# Patient Record
Sex: MALE | Race: WHITE | Employment: OTHER | URBAN - METROPOLITAN AREA
[De-identification: names, ages, dates, MRNs, and addresses within clinical notes are randomized per-mention and may not be internally consistent; named-entity substitution may affect disease eponyms.]

---

## 2017-12-22 ENCOUNTER — TELEPHONE (OUTPATIENT)
Dept: CARDIOLOGY CLINIC | Age: 82
End: 2017-12-22

## 2017-12-22 NOTE — TELEPHONE ENCOUNTER
Verified patient with two types of identifiers. Spoke to daughter and he is in a lock marrufo called [facility] and [address] [place] [address]. They contacted our office to see if he needed to follow up and were told that he has no showed several appointments. The daughter needs a note stating that he does not need a follow up appointment with Dr Syd Dean to send to his current MD at the [facility] and [address] [place] [address]. They are trying to get him in to see Dr Syd Dean and the daughter does not want him to have to leave as he is confused and she feels that this will be too much for him. Will check with MD/NP to see if we can do a letter stating that no more follow up with us is needed.

## 2017-12-22 NOTE — TELEPHONE ENCOUNTER
Verified patient with two types of identifiers. Notified daughter and letter ready for . She will  some time next week.

## 2017-12-22 NOTE — LETTER
12/22/2017 2:52 PM 
 
Mr. Samra Perez 
[facility] [id number] To Whom it may concern, No further follow up is needed for the above patient. If you have any questions please contact our office at [phone].   
 
 
 
 
 
 
 
Sincerely, 
 
 
Raquel Alvarez MD

## 2017-12-22 NOTE — TELEPHONE ENCOUNTER
Patients daughter would like to speak with someone to just clarify some things from the last hospital visit a while ago.   Phone: [phone]

## 2020-01-01 ENCOUNTER — HOSPICE ADMISSION (OUTPATIENT)
Dept: HOSPICE | Facility: HOSPICE | Age: 85
End: 2020-01-01
Payer: MEDICARE

## 2020-01-01 ENCOUNTER — APPOINTMENT (OUTPATIENT)
Dept: GENERAL RADIOLOGY | Age: 85
DRG: 951 | End: 2020-01-01
Attending: EMERGENCY MEDICINE
Payer: OTHER MISCELLANEOUS

## 2020-01-01 ENCOUNTER — HOSPITAL ENCOUNTER (EMERGENCY)
Age: 85
Discharge: HOME | End: 2020-10-04
Attending: EMERGENCY MEDICINE
Payer: MEDICARE

## 2020-01-01 ENCOUNTER — APPOINTMENT (OUTPATIENT)
Dept: GENERAL RADIOLOGY | Age: 85
End: 2020-01-01
Attending: EMERGENCY MEDICINE
Payer: MEDICARE

## 2020-01-01 ENCOUNTER — TELEPHONE (OUTPATIENT)
Dept: CARDIOLOGY CLINIC | Age: 85
End: 2020-01-01

## 2020-01-01 ENCOUNTER — HOSPITAL ENCOUNTER (INPATIENT)
Age: 85
LOS: 1 days | DRG: 951 | End: 2020-10-10
Attending: EMERGENCY MEDICINE | Admitting: FAMILY MEDICINE
Payer: OTHER MISCELLANEOUS

## 2020-01-01 VITALS
RESPIRATION RATE: 24 BRPM | WEIGHT: 176 LBS | SYSTOLIC BLOOD PRESSURE: 90 MMHG | OXYGEN SATURATION: 92 % | HEIGHT: 73 IN | TEMPERATURE: 100.7 F | HEART RATE: 131 BPM | BODY MASS INDEX: 23.33 KG/M2 | DIASTOLIC BLOOD PRESSURE: 62 MMHG

## 2020-01-01 VITALS
DIASTOLIC BLOOD PRESSURE: 45 MMHG | OXYGEN SATURATION: 98 % | HEART RATE: 62 BPM | SYSTOLIC BLOOD PRESSURE: 150 MMHG | TEMPERATURE: 98 F | RESPIRATION RATE: 16 BRPM

## 2020-01-01 DIAGNOSIS — R06.4 LABORED BREATHING: ICD-10-CM

## 2020-01-01 DIAGNOSIS — R06.82 TACHYPNEA: ICD-10-CM

## 2020-01-01 DIAGNOSIS — U07.1 COVID-19: Primary | ICD-10-CM

## 2020-01-01 DIAGNOSIS — U07.1 COVID-19: ICD-10-CM

## 2020-01-01 DIAGNOSIS — R45.1 RESTLESSNESS AND AGITATION: ICD-10-CM

## 2020-01-01 DIAGNOSIS — R41.89 DECREASED RESPONSIVENESS: ICD-10-CM

## 2020-01-01 DIAGNOSIS — J96.01 ACUTE RESPIRATORY FAILURE WITH HYPOXIA (HCC): Primary | ICD-10-CM

## 2020-01-01 DIAGNOSIS — Z51.5 HOSPICE CARE: ICD-10-CM

## 2020-01-01 DIAGNOSIS — R52 GENERALIZED PAIN: ICD-10-CM

## 2020-01-01 LAB
ALBUMIN SERPL-MCNC: 2.9 G/DL (ref 3.5–5)
ALBUMIN/GLOB SERPL: 0.6 {RATIO} (ref 1.1–2.2)
ALP SERPL-CCNC: 68 U/L (ref 45–117)
ALT SERPL-CCNC: 70 U/L (ref 12–78)
ANION GAP SERPL CALC-SCNC: 10 MMOL/L (ref 5–15)
ANION GAP SERPL CALC-SCNC: 6 MMOL/L (ref 5–15)
APPEARANCE UR: ABNORMAL
AST SERPL-CCNC: 143 U/L (ref 15–37)
ATRIAL RATE: 140 BPM
BACTERIA URNS QL MICRO: NEGATIVE /HPF
BASOPHILS # BLD: 0 K/UL (ref 0–0.1)
BASOPHILS # BLD: 0 K/UL (ref 0–0.1)
BASOPHILS NFR BLD: 0 % (ref 0–1)
BASOPHILS NFR BLD: 1 % (ref 0–1)
BILIRUB SERPL-MCNC: 0.7 MG/DL (ref 0.2–1)
BILIRUB UR QL CFM: NEGATIVE
BUN SERPL-MCNC: 23 MG/DL (ref 6–20)
BUN SERPL-MCNC: 51 MG/DL (ref 6–20)
BUN/CREAT SERPL: 17 (ref 12–20)
BUN/CREAT SERPL: 29 (ref 12–20)
CALCIUM SERPL-MCNC: 8.9 MG/DL (ref 8.5–10.1)
CALCIUM SERPL-MCNC: 9.6 MG/DL (ref 8.5–10.1)
CALCULATED P AXIS, ECG09: 85 DEGREES
CALCULATED R AXIS, ECG10: -20 DEGREES
CALCULATED T AXIS, ECG11: 44 DEGREES
CHLORIDE SERPL-SCNC: 105 MMOL/L (ref 97–108)
CHLORIDE SERPL-SCNC: 109 MMOL/L (ref 97–108)
CO2 SERPL-SCNC: 23 MMOL/L (ref 21–32)
CO2 SERPL-SCNC: 26 MMOL/L (ref 21–32)
COLOR UR: ABNORMAL
COMMENT, HOLDF: NORMAL
COMMENT, HOLDF: NORMAL
CREAT SERPL-MCNC: 1.38 MG/DL (ref 0.7–1.3)
CREAT SERPL-MCNC: 1.76 MG/DL (ref 0.7–1.3)
DIAGNOSIS, 93000: NORMAL
DIFFERENTIAL METHOD BLD: ABNORMAL
DIFFERENTIAL METHOD BLD: ABNORMAL
EOSINOPHIL # BLD: 0 K/UL (ref 0–0.4)
EOSINOPHIL # BLD: 0 K/UL (ref 0–0.4)
EOSINOPHIL NFR BLD: 0 % (ref 0–7)
EOSINOPHIL NFR BLD: 0 % (ref 0–7)
EPITH CASTS URNS QL MICRO: ABNORMAL /LPF
ERYTHROCYTE [DISTWIDTH] IN BLOOD BY AUTOMATED COUNT: 14.3 % (ref 11.5–14.5)
ERYTHROCYTE [DISTWIDTH] IN BLOOD BY AUTOMATED COUNT: 15.8 % (ref 11.5–14.5)
GLOBULIN SER CALC-MCNC: 4.6 G/DL (ref 2–4)
GLUCOSE SERPL-MCNC: 106 MG/DL (ref 65–100)
GLUCOSE SERPL-MCNC: 97 MG/DL (ref 65–100)
GLUCOSE UR STRIP.AUTO-MCNC: NEGATIVE MG/DL
GRAN CASTS URNS QL MICRO: ABNORMAL /LPF
HCT VFR BLD AUTO: 41 % (ref 36.6–50.3)
HCT VFR BLD AUTO: 51.7 % (ref 36.6–50.3)
HGB BLD-MCNC: 13.2 G/DL (ref 12.1–17)
HGB BLD-MCNC: 16.6 G/DL (ref 12.1–17)
HGB UR QL STRIP: ABNORMAL
IMM GRANULOCYTES # BLD AUTO: 0.1 K/UL (ref 0–0.04)
IMM GRANULOCYTES # BLD AUTO: 0.1 K/UL (ref 0–0.04)
IMM GRANULOCYTES NFR BLD AUTO: 1 % (ref 0–0.5)
IMM GRANULOCYTES NFR BLD AUTO: 1 % (ref 0–0.5)
KETONES UR QL STRIP.AUTO: NEGATIVE MG/DL
LACTATE SERPL-SCNC: 1.8 MMOL/L (ref 0.4–2)
LEUKOCYTE ESTERASE UR QL STRIP.AUTO: NEGATIVE
LYMPHOCYTES # BLD: 0.6 K/UL (ref 0.8–3.5)
LYMPHOCYTES # BLD: 0.8 K/UL (ref 0.8–3.5)
LYMPHOCYTES NFR BLD: 13 % (ref 12–49)
LYMPHOCYTES NFR BLD: 23 % (ref 12–49)
MCH RBC QN AUTO: 27.2 PG (ref 26–34)
MCH RBC QN AUTO: 27.6 PG (ref 26–34)
MCHC RBC AUTO-ENTMCNC: 32.1 G/DL (ref 30–36.5)
MCHC RBC AUTO-ENTMCNC: 32.2 G/DL (ref 30–36.5)
MCV RBC AUTO: 84.6 FL (ref 80–99)
MCV RBC AUTO: 85.6 FL (ref 80–99)
MONOCYTES # BLD: 0.2 K/UL (ref 0–1)
MONOCYTES # BLD: 0.7 K/UL (ref 0–1)
MONOCYTES NFR BLD: 19 % (ref 5–13)
MONOCYTES NFR BLD: 4 % (ref 5–13)
NEUTS SEG # BLD: 1.9 K/UL (ref 1.8–8)
NEUTS SEG # BLD: 4 K/UL (ref 1.8–8)
NEUTS SEG NFR BLD: 56 % (ref 32–75)
NEUTS SEG NFR BLD: 82 % (ref 32–75)
NITRITE UR QL STRIP.AUTO: NEGATIVE
NRBC # BLD: 0 K/UL (ref 0–0.01)
NRBC # BLD: 0 K/UL (ref 0–0.01)
NRBC BLD-RTO: 0 PER 100 WBC
NRBC BLD-RTO: 0 PER 100 WBC
P-R INTERVAL, ECG05: 174 MS
PH UR STRIP: 5 [PH] (ref 5–8)
PLATELET # BLD AUTO: 131 K/UL (ref 150–400)
PLATELET # BLD AUTO: 173 K/UL (ref 150–400)
PMV BLD AUTO: 10.6 FL (ref 8.9–12.9)
PMV BLD AUTO: 10.8 FL (ref 8.9–12.9)
POTASSIUM SERPL-SCNC: 3.9 MMOL/L (ref 3.5–5.1)
POTASSIUM SERPL-SCNC: 4 MMOL/L (ref 3.5–5.1)
PROT SERPL-MCNC: 7.5 G/DL (ref 6.4–8.2)
PROT UR STRIP-MCNC: 100 MG/DL
Q-T INTERVAL, ECG07: 328 MS
QRS DURATION, ECG06: 74 MS
QTC CALCULATION (BEZET), ECG08: 500 MS
RBC # BLD AUTO: 4.79 M/UL (ref 4.1–5.7)
RBC # BLD AUTO: 6.11 M/UL (ref 4.1–5.7)
RBC #/AREA URNS HPF: ABNORMAL /HPF (ref 0–5)
RBC MORPH BLD: ABNORMAL
SAMPLES BEING HELD,HOLD: NORMAL
SAMPLES BEING HELD,HOLD: NORMAL
SODIUM SERPL-SCNC: 137 MMOL/L (ref 136–145)
SODIUM SERPL-SCNC: 142 MMOL/L (ref 136–145)
SP GR UR REFRACTOMETRY: 1.02 (ref 1–1.03)
UR CULT HOLD, URHOLD: NORMAL
UROBILINOGEN UR QL STRIP.AUTO: 1 EU/DL (ref 0.2–1)
VENTRICULAR RATE, ECG03: 140 BPM
WBC # BLD AUTO: 3.5 K/UL (ref 4.1–11.1)
WBC # BLD AUTO: 4.9 K/UL (ref 4.1–11.1)
WBC URNS QL MICRO: ABNORMAL /HPF (ref 0–4)
YEAST URNS QL MICRO: PRESENT

## 2020-01-01 PROCEDURE — 93005 ELECTROCARDIOGRAM TRACING: CPT

## 2020-01-01 PROCEDURE — 36415 COLL VENOUS BLD VENIPUNCTURE: CPT

## 2020-01-01 PROCEDURE — 96375 TX/PRO/DX INJ NEW DRUG ADDON: CPT

## 2020-01-01 PROCEDURE — 80048 BASIC METABOLIC PNL TOTAL CA: CPT

## 2020-01-01 PROCEDURE — 80053 COMPREHEN METABOLIC PANEL: CPT

## 2020-01-01 PROCEDURE — 74011000258 HC RX REV CODE- 258: Performed by: EMERGENCY MEDICINE

## 2020-01-01 PROCEDURE — 74011000250 HC RX REV CODE- 250: Performed by: FAMILY MEDICINE

## 2020-01-01 PROCEDURE — 85025 COMPLETE CBC W/AUTO DIFF WBC: CPT

## 2020-01-01 PROCEDURE — 74011250636 HC RX REV CODE- 250/636: Performed by: EMERGENCY MEDICINE

## 2020-01-01 PROCEDURE — 74011250636 HC RX REV CODE- 250/636: Performed by: FAMILY MEDICINE

## 2020-01-01 PROCEDURE — 65270000029 HC RM PRIVATE

## 2020-01-01 PROCEDURE — 99291 CRITICAL CARE FIRST HOUR: CPT

## 2020-01-01 PROCEDURE — 0656 HSPC GENERAL INPATIENT

## 2020-01-01 PROCEDURE — 87040 BLOOD CULTURE FOR BACTERIA: CPT

## 2020-01-01 PROCEDURE — 83605 ASSAY OF LACTIC ACID: CPT

## 2020-01-01 PROCEDURE — 96367 TX/PROPH/DG ADDL SEQ IV INF: CPT

## 2020-01-01 PROCEDURE — 5A09357 ASSISTANCE WITH RESPIRATORY VENTILATION, LESS THAN 24 CONSECUTIVE HOURS, CONTINUOUS POSITIVE AIRWAY PRESSURE: ICD-10-PCS | Performed by: FAMILY MEDICINE

## 2020-01-01 PROCEDURE — 51701 INSERT BLADDER CATHETER: CPT

## 2020-01-01 PROCEDURE — 99223 1ST HOSP IP/OBS HIGH 75: CPT | Performed by: FAMILY MEDICINE

## 2020-01-01 PROCEDURE — 3336500001 HSPC ELECTION

## 2020-01-01 PROCEDURE — 71045 X-RAY EXAM CHEST 1 VIEW: CPT

## 2020-01-01 PROCEDURE — 74011250637 HC RX REV CODE- 250/637: Performed by: NURSE PRACTITIONER

## 2020-01-01 PROCEDURE — 81001 URINALYSIS AUTO W/SCOPE: CPT

## 2020-01-01 PROCEDURE — 94660 CPAP INITIATION&MGMT: CPT

## 2020-01-01 PROCEDURE — 96365 THER/PROPH/DIAG IV INF INIT: CPT

## 2020-01-01 PROCEDURE — 99285 EMERGENCY DEPT VISIT HI MDM: CPT

## 2020-01-01 PROCEDURE — 74011250637 HC RX REV CODE- 250/637: Performed by: EMERGENCY MEDICINE

## 2020-01-01 RX ORDER — ONDANSETRON 2 MG/ML
4 INJECTION INTRAMUSCULAR; INTRAVENOUS
Status: COMPLETED | OUTPATIENT
Start: 2020-01-01 | End: 2020-01-01

## 2020-01-01 RX ORDER — ACETAMINOPHEN 650 MG/1
650 SUPPOSITORY RECTAL
Status: DISCONTINUED | OUTPATIENT
Start: 2020-01-01 | End: 2020-10-11 | Stop reason: HOSPADM

## 2020-01-01 RX ORDER — MORPHINE SULFATE 2 MG/ML
4 INJECTION, SOLUTION INTRAMUSCULAR; INTRAVENOUS ONCE
Status: COMPLETED | OUTPATIENT
Start: 2020-01-01 | End: 2020-01-01

## 2020-01-01 RX ORDER — ACETAMINOPHEN 650 MG/1
650 SUPPOSITORY RECTAL
Status: COMPLETED | OUTPATIENT
Start: 2020-01-01 | End: 2020-01-01

## 2020-01-01 RX ORDER — HYDROMORPHONE HYDROCHLORIDE 1 MG/ML
0.5 INJECTION, SOLUTION INTRAMUSCULAR; INTRAVENOUS; SUBCUTANEOUS EVERY 4 HOURS
Status: DISCONTINUED | OUTPATIENT
Start: 2020-01-01 | End: 2020-01-01

## 2020-01-01 RX ORDER — MORPHINE SULFATE 2 MG/ML
2 INJECTION, SOLUTION INTRAMUSCULAR; INTRAVENOUS
Status: DISCONTINUED | OUTPATIENT
Start: 2020-01-01 | End: 2020-01-01

## 2020-01-01 RX ORDER — HYDROMORPHONE HYDROCHLORIDE 1 MG/ML
1 INJECTION, SOLUTION INTRAMUSCULAR; INTRAVENOUS; SUBCUTANEOUS
Status: DISCONTINUED | OUTPATIENT
Start: 2020-01-01 | End: 2020-10-11 | Stop reason: HOSPADM

## 2020-01-01 RX ORDER — GLYCOPYRROLATE 0.2 MG/ML
0.2 INJECTION INTRAMUSCULAR; INTRAVENOUS
Status: DISCONTINUED | OUTPATIENT
Start: 2020-01-01 | End: 2020-10-11 | Stop reason: HOSPADM

## 2020-01-01 RX ORDER — HYDROMORPHONE HYDROCHLORIDE 1 MG/ML
1 INJECTION, SOLUTION INTRAMUSCULAR; INTRAVENOUS; SUBCUTANEOUS EVERY 4 HOURS
Status: DISCONTINUED | OUTPATIENT
Start: 2020-01-01 | End: 2020-10-11 | Stop reason: HOSPADM

## 2020-01-01 RX ORDER — MORPHINE SULFATE 2 MG/ML
2 INJECTION, SOLUTION INTRAMUSCULAR; INTRAVENOUS EVERY 4 HOURS
Status: DISCONTINUED | OUTPATIENT
Start: 2020-01-01 | End: 2020-01-01

## 2020-01-01 RX ORDER — ONDANSETRON 2 MG/ML
4 INJECTION INTRAMUSCULAR; INTRAVENOUS
Status: DISCONTINUED | OUTPATIENT
Start: 2020-01-01 | End: 2020-10-11 | Stop reason: HOSPADM

## 2020-01-01 RX ORDER — HYDROMORPHONE HYDROCHLORIDE 1 MG/ML
0.5 INJECTION, SOLUTION INTRAMUSCULAR; INTRAVENOUS; SUBCUTANEOUS
Status: DISCONTINUED | OUTPATIENT
Start: 2020-01-01 | End: 2020-01-01

## 2020-01-01 RX ORDER — KETOROLAC TROMETHAMINE 30 MG/ML
30 INJECTION, SOLUTION INTRAMUSCULAR; INTRAVENOUS
Status: DISCONTINUED | OUTPATIENT
Start: 2020-01-01 | End: 2020-10-11 | Stop reason: HOSPADM

## 2020-01-01 RX ORDER — SODIUM CHLORIDE 0.9 % (FLUSH) 0.9 %
10 SYRINGE (ML) INJECTION AS NEEDED
Status: DISCONTINUED | OUTPATIENT
Start: 2020-01-01 | End: 2020-10-11 | Stop reason: HOSPADM

## 2020-01-01 RX ORDER — LORAZEPAM 2 MG/ML
0.5 INJECTION INTRAMUSCULAR
Status: DISCONTINUED | OUTPATIENT
Start: 2020-01-01 | End: 2020-10-11 | Stop reason: HOSPADM

## 2020-01-01 RX ORDER — FACIAL-BODY WIPES
10 EACH TOPICAL DAILY PRN
Status: DISCONTINUED | OUTPATIENT
Start: 2020-01-01 | End: 2020-10-11 | Stop reason: HOSPADM

## 2020-01-01 RX ORDER — LORAZEPAM 2 MG/ML
0.5 INJECTION INTRAMUSCULAR EVERY 4 HOURS
Status: DISCONTINUED | OUTPATIENT
Start: 2020-01-01 | End: 2020-10-11 | Stop reason: HOSPADM

## 2020-01-01 RX ADMIN — HYDROMORPHONE HYDROCHLORIDE 1 MG: 1 INJECTION, SOLUTION INTRAMUSCULAR; INTRAVENOUS; SUBCUTANEOUS at 23:20

## 2020-01-01 RX ADMIN — MORPHINE SULFATE 2 MG: 2 INJECTION, SOLUTION INTRAMUSCULAR; INTRAVENOUS at 19:33

## 2020-01-01 RX ADMIN — MORPHINE SULFATE 2 MG: 2 INJECTION, SOLUTION INTRAMUSCULAR; INTRAVENOUS at 18:48

## 2020-01-01 RX ADMIN — Medication 10 ML: at 13:03

## 2020-01-01 RX ADMIN — SODIUM CHLORIDE 500 ML: 9 INJECTION, SOLUTION INTRAVENOUS at 12:31

## 2020-01-01 RX ADMIN — ONDANSETRON 4 MG: 2 INJECTION INTRAMUSCULAR; INTRAVENOUS at 14:27

## 2020-01-01 RX ADMIN — LORAZEPAM 0.5 MG: 2 INJECTION INTRAMUSCULAR; INTRAVENOUS at 23:22

## 2020-01-01 RX ADMIN — LORAZEPAM 0.5 MG: 2 INJECTION INTRAMUSCULAR; INTRAVENOUS at 15:45

## 2020-01-01 RX ADMIN — LORAZEPAM 0.5 MG: 2 INJECTION INTRAMUSCULAR; INTRAVENOUS at 21:29

## 2020-01-01 RX ADMIN — HYDROMORPHONE HYDROCHLORIDE 1 MG: 1 INJECTION, SOLUTION INTRAMUSCULAR; INTRAVENOUS; SUBCUTANEOUS at 03:44

## 2020-01-01 RX ADMIN — MORPHINE SULFATE 2 MG: 2 INJECTION, SOLUTION INTRAMUSCULAR; INTRAVENOUS at 16:50

## 2020-01-01 RX ADMIN — AZITHROMYCIN MONOHYDRATE 500 MG: 500 INJECTION, POWDER, LYOPHILIZED, FOR SOLUTION INTRAVENOUS at 13:33

## 2020-01-01 RX ADMIN — SODIUM CHLORIDE 500 ML: 9 INJECTION, SOLUTION INTRAVENOUS at 11:34

## 2020-01-01 RX ADMIN — LORAZEPAM 0.5 MG: 2 INJECTION INTRAMUSCULAR; INTRAVENOUS at 19:03

## 2020-01-01 RX ADMIN — MORPHINE SULFATE 4 MG: 2 INJECTION, SOLUTION INTRAMUSCULAR; INTRAVENOUS at 14:30

## 2020-01-01 RX ADMIN — CEFTRIAXONE 1 G: 1 INJECTION, POWDER, FOR SOLUTION INTRAMUSCULAR; INTRAVENOUS at 12:50

## 2020-01-01 RX ADMIN — GLYCOPYRROLATE 0.2 MG: 0.2 INJECTION, SOLUTION INTRAMUSCULAR; INTRAVENOUS at 07:25

## 2020-01-01 RX ADMIN — HYDROMORPHONE HYDROCHLORIDE 1 MG: 1 INJECTION, SOLUTION INTRAMUSCULAR; INTRAVENOUS; SUBCUTANEOUS at 10:03

## 2020-01-01 RX ADMIN — LORAZEPAM 0.5 MG: 2 INJECTION INTRAMUSCULAR; INTRAVENOUS at 03:44

## 2020-01-01 RX ADMIN — GLYCOPYRROLATE 0.2 MG: 0.2 INJECTION, SOLUTION INTRAMUSCULAR; INTRAVENOUS at 11:39

## 2020-01-01 RX ADMIN — HYDROMORPHONE HYDROCHLORIDE 1 MG: 1 INJECTION, SOLUTION INTRAMUSCULAR; INTRAVENOUS; SUBCUTANEOUS at 07:24

## 2020-01-01 RX ADMIN — HYDROMORPHONE HYDROCHLORIDE 1 MG: 1 INJECTION, SOLUTION INTRAMUSCULAR; INTRAVENOUS; SUBCUTANEOUS at 11:36

## 2020-01-01 RX ADMIN — MORPHINE SULFATE 2 MG: 2 INJECTION, SOLUTION INTRAMUSCULAR; INTRAVENOUS at 21:29

## 2020-01-01 RX ADMIN — LORAZEPAM 0.5 MG: 2 INJECTION INTRAMUSCULAR; INTRAVENOUS at 07:25

## 2020-01-01 RX ADMIN — KETOROLAC TROMETHAMINE 30 MG: 30 INJECTION, SOLUTION INTRAMUSCULAR at 11:36

## 2020-01-01 RX ADMIN — ACETAMINOPHEN 650 MG: 650 SUPPOSITORY RECTAL at 16:10

## 2020-01-01 RX ADMIN — GLYCOPYRROLATE 0.2 MG: 0.2 INJECTION, SOLUTION INTRAMUSCULAR; INTRAVENOUS at 23:19

## 2020-01-01 RX ADMIN — HYDROMORPHONE HYDROCHLORIDE 1 MG: 1 INJECTION, SOLUTION INTRAMUSCULAR; INTRAVENOUS; SUBCUTANEOUS at 19:03

## 2020-01-01 RX ADMIN — LORAZEPAM 0.5 MG: 2 INJECTION INTRAMUSCULAR; INTRAVENOUS at 18:46

## 2020-01-01 RX ADMIN — HYDROMORPHONE HYDROCHLORIDE 1 MG: 1 INJECTION, SOLUTION INTRAMUSCULAR; INTRAVENOUS; SUBCUTANEOUS at 12:57

## 2020-01-01 RX ADMIN — HYDROMORPHONE HYDROCHLORIDE 1 MG: 1 INJECTION, SOLUTION INTRAMUSCULAR; INTRAVENOUS; SUBCUTANEOUS at 15:45

## 2020-01-01 RX ADMIN — MORPHINE SULFATE 2 MG: 2 INJECTION, SOLUTION INTRAMUSCULAR; INTRAVENOUS at 17:45

## 2020-01-01 RX ADMIN — LORAZEPAM 0.5 MG: 2 INJECTION INTRAMUSCULAR; INTRAVENOUS at 16:54

## 2020-01-01 RX ADMIN — KETOROLAC TROMETHAMINE 30 MG: 30 INJECTION, SOLUTION INTRAMUSCULAR at 19:03

## 2020-01-01 RX ADMIN — LORAZEPAM 0.5 MG: 2 INJECTION INTRAMUSCULAR; INTRAVENOUS at 11:37

## 2020-01-01 RX ADMIN — ACETAMINOPHEN 650 MG: 650 SUPPOSITORY RECTAL at 12:25

## 2020-01-01 RX ADMIN — KETOROLAC TROMETHAMINE 30 MG: 30 INJECTION, SOLUTION INTRAMUSCULAR at 23:58

## 2020-01-01 RX ADMIN — GLYCOPYRROLATE 0.2 MG: 0.2 INJECTION, SOLUTION INTRAMUSCULAR; INTRAVENOUS at 03:44

## 2020-09-02 NOTE — TELEPHONE ENCOUNTER
Patient's daughter, Ritika Hidalgo, came to office stating her brother is having some cardiac issues that they believe could be hereditary. Sara Ramirez requesting a copy of patient's last office note with patient's diagnosis. Sara Ramirez is on last noted HIPAA form. Sara Ramirez brought patient's signed copy of current [facility]. Alyssia signed Authorization to disclose health information from. Last office and hospital note given to Sara Ramirez. Sara Ramirez verbalized understanding and will call with any other questions.

## 2020-10-04 NOTE — ED NOTES
I have received a call from a Noel Chen who identified herself as infection control for the facility. She is stated that they are in the process of creating another COVID unit so that they are able to accept these patients back. We will be called as soon as those beds are ready. 8:36 AM

## 2020-10-04 NOTE — ED NOTES
Bedside and Verbal shift change report given to [facility] (oncoming nurse) by Carolyne Gilmore (offgoing nurse). Report included the following information SBAR, ED Summary, MAR and Recent Results.

## 2020-10-04 NOTE — ED NOTES
RN contacted [facility] and spoke with patient's caretaker, Trisha Deonna. Trisha Ying informed writing RN that pt is no longer accepted to facility due to \"inabiltity to meet patient's needs\" Trisha Ying transferred writing RN to [place], nurse supervisor. Nurse supervisor would not provide further information in regards to inability to meet pt's needs. Nurse supervisor provided contact information for hemal Portillo. Both numbers provided to Dr. Moni Bojorquez. Voice messages left due to no answer. Attempted to call back gloria Chauhan supervisor, no answer. Charge notified.

## 2020-10-04 NOTE — PROGRESS NOTES
1230 Received call from Garcia Millan, they are able to accept patient back after  1500. [facility] set up for 1430. [facility] phone [phone] Date of previous inpatient admission/ ED visit? N/A What brought the patient back to ED? COVID + Did patient decline recommended services during last admission/ ED visit (if yes, what)? N/A Has patient seen a provider since their last inpatient admission/ED visit (if yes, when)? LTC at [place] [address]. PCP: First and Last name:  Marcio/Asad HENSLEY 
 Name of Practice:  
 Are you a current patient: Yes/No:  
 Approximate date of last visit: 
 
CM Interventions: 
 
0720  Received handoff from Nursing Supervisor, patient brought to [facility] E from [address] [facility] and [address] [facility] for positive DOROTHEA test.  Per chart/EMS report, he tested positive and then was sent to [facility]/ED. Per MD, medically stable for discharge back to the facility. Dr. Cristina Forrester and ED staff/Nursing Supervisors attempted to get patient back to [address] [facility] and [address] [facility], [address] refused to take back patient. 0800 Spoke with ED Charge RN, Dr. Nanci Ramirez received call from Infection Control Nurse/Eloise Mccullough. 0815 CM called facility and Nursing Supervisor on unit and unable to come to phone. They asked CM to call back, Angeles Ridley and additional staff will be in around [address] with Cindi Cancino/ on call, they are opening additional COVID unit. She will call CM when they can accept patient back to facility. 0945 CM will set up transport for WILL CALL for transport back to [place] [address].. Natalie Duncan  [phone] Care Management Interventions Mode of Transport at Discharge: BLS(Stretcher Transport) Transition of Care Consult (CM Consult): Discharge Planning, Other(Medically stable for discharge, COVID +) Current Support Network: Nursing Facility(Resides at [place] [address].) Discharge Location Discharge Placement: Long Term Care(Discharging back to [facility]) Shawn Casiano RN, BSN, [facility] 
ED Care Management [phone]

## 2020-10-04 NOTE — ED NOTES
Pt's brief changed. Pillow placed between legs due to contracture of BLE to prevent pressure wound. Pt resting comfortably. NAD, vitals stable, @ baseline mental status.

## 2020-10-04 NOTE — ED NOTES
Upon entering room pt sleeping. Pt easily aroused by voice. VSS, NAD, respirations equal and unlabored. Denies pain. A+Ox1. Denies needs at this time.

## 2020-10-04 NOTE — ED TRIAGE NOTES
Pt BIBA from SNF c/o +COVID test and facility requesting medical exam; pt denies complaints; @ baseline mental status; VSS Past Medical History:  
Diagnosis Date  Arthritis  BPH (benign prostatic hyperplasia)  Dementia  Hearing loss   
 with aids  Hypertension 5/8/13  
 cerebrovascular white matter dz also  Stroke ([facility] [address]  Syncope and collapse

## 2020-10-04 NOTE — ED NOTES
RN at bedside. Pt lying on left side. Arouses to voice. NAD, respirations equal and unlabored, denies pain, VSS.

## 2020-10-09 PROBLEM — J96.00 ACUTE RESPIRATORY FAILURE (HCC): Status: ACTIVE | Noted: 2020-01-01

## 2020-10-09 NOTE — ED NOTES
Pt taken off bipap and placed on NRB per Dr. Larry Metz orders. Dr. Alberto Nunez states he spoke with the family and they are in agreement with this decision. Pt with continued runs of SVT. Dr. Alberto Nunez notified.

## 2020-10-09 NOTE — ED PROVIDER NOTES
HPI .  Patient is unable to give a history. He has a history of dementia, hearing loss, CVA, syncope, hypertension, arthritis, and BPH. History is from EMS. Patient lives at a nursing home and he has been on the Covid floor. He reportedly tested positive four  days ago. Patient reportedly developed a fever of 102 today, shortness of breath, tachypnea, desaturation to 88%, and tachycardia. Glucose was 171. I am told that the patient is a DNR. He lives at the [address] [place] [address]. Past Medical History:  
Diagnosis Date  Arthritis  BPH (benign prostatic hyperplasia)  Dementia ([facility] [address]  Hearing loss   
 with aids  Hypertension 5/8/13  
 cerebrovascular white matter dz also  Stroke ([facility] [address]  Syncope and collapse Past Surgical History:  
Procedure Laterality Date  ENDOSCOPY, COLON, DIAGNOSTIC  10/03  
 due 10/13  ENDOSCOPY, COLON, DIAGNOSTIC  1/2011  
 does not need to repeat d/t age  HX HEENT    
 cataract removal  
 HX KNEE REPLACEMENT    
 HX ORTHOPAEDIC  5/06  
 growth removed right palm  HX PACEMAKER    
 HX TONSIL AND ADENOIDECTOMY  HX VASECTOMY  IMPLANT  LOOP RECORDER  6/20/2013  TOTAL KNEE ARTHROPLASTY  1/21/02  
 left Family History:  
Problem Relation Age of Onset  Cancer Maternal Grandmother   
     breast cancer  Cancer Paternal Grandmother   
     stomach cancer Social History Socioeconomic History  Marital status:  Spouse name: Not on file  Number of children: Not on file  Years of education: Not on file  Highest education level: Not on file Occupational History  Not on file Social Needs  Financial resource strain: Not on file  Food insecurity Worry: Not on file Inability: Not on file  Transportation needs Medical: Not on file Non-medical: Not on file Tobacco Use  Smoking status: Former Smoker Packs/day: 0.75 Years: 30.00 Pack years: 22.50 Last attempt to quit: 1981 Years since quittin.5  Smokeless tobacco: Never Used Substance and Sexual Activity  Alcohol use: No  
  Alcohol/week: 1.7 standard drinks Types: 2 Glasses of wine per week  Drug use: No  
 Sexual activity: Never Lifestyle  Physical activity Days per week: Not on file Minutes per session: Not on file  Stress: Not on file Relationships  Social connections Talks on phone: Not on file Gets together: Not on file Attends [employer] service: Not on file Active member of club or organization: Not on file Attends meetings of clubs or organizations: Not on file Relationship status: Not on file  Intimate partner violence Fear of current or ex partner: Not on file Emotionally abused: Not on file Physically abused: Not on file Forced sexual activity: Not on file Other Topics Concern  Not on file Social History Narrative  Not on file ALLERGIES: Bactrim [sulfamethoprim ds] and Sulfa (sulfonamide antibiotics) Review of Systems Reason unable to perform ROS: Patient is nonverbal.  
 
 
Vitals:  
 10/09/20 1130 10/09/20 1131 10/09/20 1136 10/09/20 1138 BP:   (!) 89/54 (!) 89/68 Pulse: (!) 226  (!) 134 (!) 136 Resp: 30  (!) 31 30 Temp:      
SpO2: (!) 89% 92% Physical Exam 
Vitals signs and nursing note reviewed. Constitutional:   
   Comments: Elderly; does not make eye contact; does not follow simple commands; resist eye opening; actively resist movement of his extremities HENT:  
   Head: Normocephalic and atraumatic. Eyes:  
   Pupils: Pupils are equal, round, and reactive to light. Neck: Musculoskeletal: Normal range of motion and neck supple. Cardiovascular:  
   Rate and Rhythm: Regular rhythm. Heart sounds: Normal heart sounds. No murmur. No friction rub. No gallop. Comments: Pulse rate 140 Pulmonary: Effort: Tachypnea present. No respiratory distress. Breath sounds: No wheezing or rales. Comments: Respiratory rate 35 Abdominal:  
   Palpations: Abdomen is soft. Tenderness: There is no abdominal tenderness. There is no rebound. Musculoskeletal: Normal range of motion. General: No tenderness. Skin: 
   Findings: No erythema. Neurological:  
   Cranial Nerves: No cranial nerve deficit. Comments: Motor; symmetric Psychiatric:     
   Behavior: Behavior normal.  
 
  
 
MDM Procedures ED EKG interpretation: 
Rhythm: sinus tachycardia; and regular . Rate (approx.): 140; Axis: normal; P wave: normal; QRS interval: normal ; ST/T wave: normal; in  Lead: III q, aVF q, V3 q and V4 q; Other findings: . This EKG was interpreted by Joanne Sampson MD,ED Provider. 11:49 AM 
 
 
 
 
Note: Patient is known to be COVID positive. He presents with fever, tachycardia, and hypoxia. Chest x-ray apparently has improved but to my reading there is still a significant left-sided infiltrate. Patient will be started on antibiotics for secondary bacterial pneumonia. I just ordered a straight cath for UA in case the fever is from a urinary source. Blood cultures are pending. With the tachycardia, tachypnea, and hypoxia patient should be admitted to the stepdown unit. He is a DNR according to EMS report. Joanne Sampson MD 
12:27 PM 
 
 
Note: I spoke to his daughter Isiah Mei. She does not want pressors started. She wants her father to be comfortable. We will put patient on a nonrebreather mask and stop BiPAP. Case discussed with Dr. Ghada Roldan MD 
12:55 PM 
 
 
Perfect Serve Consult for Admission 12:56 PM 
 
ED Room Number: JN38/78 Patient Name and age: Sherif Leiva 80 y.o.  male Working Diagnosis: 1. Acute respiratory failure with hypoxia ([address] 2. COVID-19 COVID-19 Suspicion:  yes Sepsis present:  yes  Reassessment needed: no 
 Code Status:  Do Not Resuscitate Readmission: no 
Isolation Requirements:  yes Recommended Level of Care:  med/surg Department:[facility] Adult ED - [phone] Other:

## 2020-10-09 NOTE — ED TRIAGE NOTES
Pt comes to ED from SNF with known covid. Pt with increased work of breathing. Pt arrives with NRB and was placed on bipap by RT. Pt placed on cardiac monitor and continuous pulse ox.

## 2020-10-09 NOTE — CONSULTS
Medical Consultation Report Patient:  Selma Ayon MRN:  [id number] YOB: 1932 Age:  80 y.o. Date of consultation:  10/9/2020 Reason for consultation:Acute hypoxic respiratory failure History of present illness Selma Ayon is a 80 y.o. male  With PMH of dementia , CVA, HTN, BPH came to ED from [address] [facility] for hypoxia and fever. Patient is letahrgic and hx dementia, he is poor historian. Per ED notes: pt reportedly tested positive for Covid 19  four days ago. He developed  fever of 102 today, shortness of breath, tachypnea, desaturation to 88%, and tachycardia. So he was sent to [facility] ED. He was placed on bipap on arrival to ED. ED physician Dr. Jannet Perkins discussed with patient's daughter who agreed for comfort measures. Patient was then placed on Non air breather 10l NC. Hospitalist consulted for admission. Patient is seen and examined at bedside in ED. Lethargic, not responsive and moaning in pain. Discussed with ED physician to consult hospice as pt will qualify for inpatient hospice. Past Medical History:  
Diagnosis Date  Arthritis  BPH (benign prostatic hyperplasia)  Dementia ([address]  Hearing loss   
 with aids  Hypertension 5/8/13  
 cerebrovascular white matter dz also  Stroke ([address]  Syncope and collapse Past Surgical History:  
Procedure Laterality Date  ENDOSCOPY, COLON, DIAGNOSTIC  10/03  
 due 10/13  ENDOSCOPY, COLON, DIAGNOSTIC  1/2011  
 does not need to repeat d/t age  HX HEENT    
 cataract removal  
 HX KNEE REPLACEMENT    
 HX ORTHOPAEDIC  5/06  
 growth removed right palm  HX PACEMAKER    
 HX TONSIL AND ADENOIDECTOMY  HX VASECTOMY  IMPLANT  LOOP RECORDER  6/20/2013  TOTAL KNEE ARTHROPLASTY  1/21/02  
 left Prior to Admission medications Medication Sig Start Date End Date Taking? Authorizing Provider  
busPIRone (BUSPAR) 5 mg tablet Take 5 mg by mouth two (2) times a day. Provider, Historical  
clonazePAM (KLONOPIN) 0.5 mg tablet Take 0.5 mg by mouth nightly. Provider, Historical  
memantine (NAMENDA) 5 mg tablet Take 5 mg by mouth two (2) times a day. Provider, Historical  
risperiDONE (RISPERDAL) 1 mg tablet Take 1 mg by mouth two (2) times daily as needed. Provider, Historical  
traZODone (DESYREL) 50 mg tablet Take 50 mg by mouth nightly. Provider, Historical  
atenolol (TENORMIN) 25 mg tablet Take 25 mg by mouth daily. Hold for SBP <140    Provider, Historical  
acetaminophen (TYLENOL) 325 mg tablet Take 325 mg by mouth every four (4) hours as needed. Provider, Historical  
aspirin 81 mg chewable tablet Take 81 mg by mouth daily. Provider, Historical  
multivitamin (ONE A DAY) tablet Take 1 Tab by mouth daily. Provider, Historical  
tamsulosin (FLOMAX) 0.4 mg capsule Take 0.4 mg by mouth daily. Provider, Historical  
finasteride (PROSCAR) 5 mg tablet Take 5 mg by mouth daily. Provider, Historical  
 
Current Facility-Administered Medications Medication Dose Route Frequency  morphine injection 2 mg  2 mg IntraVENous Q4H  
 LORazepam (ATIVAN) injection 0.5 mg  0.5 mg IntraVENous Q4H Current Outpatient Medications Medication Sig  
 busPIRone (BUSPAR) 5 mg tablet Take 5 mg by mouth two (2) times a day.  clonazePAM (KLONOPIN) 0.5 mg tablet Take 0.5 mg by mouth nightly.  memantine (NAMENDA) 5 mg tablet Take 5 mg by mouth two (2) times a day.  risperiDONE (RISPERDAL) 1 mg tablet Take 1 mg by mouth two (2) times daily as needed.  traZODone (DESYREL) 50 mg tablet Take 50 mg by mouth nightly.  atenolol (TENORMIN) 25 mg tablet Take 25 mg by mouth daily. Hold for SBP <140  acetaminophen (TYLENOL) 325 mg tablet Take 325 mg by mouth every four (4) hours as needed.  aspirin 81 mg chewable tablet Take 81 mg by mouth daily.  multivitamin (ONE A DAY) tablet Take 1 Tab by mouth daily.  tamsulosin (FLOMAX) 0.4 mg capsule Take 0.4 mg by mouth daily.  finasteride (PROSCAR) 5 mg tablet Take 5 mg by mouth daily. Allergies Allergen Reactions  Bactrim [Sulfamethoprim Ds] Nausea Only  Sulfa (Sulfonamide Antibiotics) Nausea Only  
  dizzy Family History Problem Relation Age of Onset  Cancer Maternal Grandmother   
     breast cancer  Cancer Paternal Grandmother   
     stomach cancer Social history Social History Socioeconomic History  Marital status:  Spouse name: Not on file  Number of children: Not on file  Years of education: Not on file  Highest education level: Not on file Tobacco Use  Smoking status: Former Smoker Packs/day: 0.75 Years: 30.00 Pack years: 22.50 Last attempt to quit: 1981 Years since quittin.5  Smokeless tobacco: Never Used Substance and Sexual Activity  Alcohol use: No  
  Alcohol/week: 1.7 standard drinks Types: 2 Glasses of wine per week  Drug use: No  
 Sexual activity: Never Review of systems Review of systems not obtained due to patient factors. Physical Examination Visit Vitals BP (!) 114/51 Pulse (!) 113 Temp (!) 101.6 °F (38.7 °C) Resp (!) 31 SpO2 98% O2 Flow Rate (L/min): 10 l/min O2 Device: Room air(Joslyn HENSLEY and RN placed pt on RA. ) GENERAL:   moderate - severe distress HEENT:  Normocephalic, atraumatic NECK:  trachea midline LUNGS:   Coarse breath sounds HEART:   S1 and S2 well heard,tachycardia ABDOMEB:   Soft, non-tender. Normoactive bowel sounds. EXTREMETIES:  Atraumatic, acyanotic, no edema PULSES: 2+ and symmetric all extremities. SKIN:  No rashes or lesions NEUROLOGY:  lethargic Data Review 24 Hour Results: Recent Results (from the past 24 hour(s)) SAMPLES BEING HELD Collection Time: 10/09/20 11:17 AM  
Result Value Ref Range SAMPLES BEING HELD 1RED,1BLU   
 COMMENT Add-on orders for these samples will be processed based on acceptable specimen integrity and analyte stability, which may vary by analyte. CBC WITH AUTOMATED DIFF Collection Time: 10/09/20 11:22 AM  
Result Value Ref Range WBC 4.9 4.1 - 11.1 K/uL  
 RBC 6.11 (H) 4.10 - 5.70 M/uL  
 HGB 16.6 12.1 - 17.0 g/dL HCT 51.7 (H) 36.6 - 50.3 % MCV 84.6 80.0 - 99.0 FL  
 MCH 27.2 26.0 - 34.0 PG  
 MCHC 32.1 30.0 - 36.5 g/dL  
 RDW 15.8 (H) 11.5 - 14.5 % PLATELET 989 684 - 023 K/uL MPV 10.8 8.9 - 12.9 FL  
 NRBC 0.0 0  WBC ABSOLUTE NRBC 0.00 0.00 - 0.01 K/uL NEUTROPHILS 82 (H) 32 - 75 % LYMPHOCYTES 13 12 - 49 % MONOCYTES 4 (L) 5 - 13 % EOSINOPHILS 0 0 - 7 % BASOPHILS 0 0 - 1 % IMMATURE GRANULOCYTES 1 (H) 0.0 - 0.5 % ABS. NEUTROPHILS 4.0 1.8 - 8.0 K/UL  
 ABS. LYMPHOCYTES 0.6 (L) 0.8 - 3.5 K/UL  
 ABS. MONOCYTES 0.2 0.0 - 1.0 K/UL  
 ABS. EOSINOPHILS 0.0 0.0 - 0.4 K/UL  
 ABS. BASOPHILS 0.0 0.0 - 0.1 K/UL  
 ABS. IMM. GRANS. 0.1 (H) 0.00 - 0.04 K/UL  
 DF SMEAR SCANNED    
 RBC COMMENTS NORMOCYTIC, NORMOCHROMIC METABOLIC PANEL, COMPREHENSIVE Collection Time: 10/09/20 11:22 AM  
Result Value Ref Range Sodium 142 136 - 145 mmol/L Potassium 3.9 3.5 - 5.1 mmol/L Chloride 109 (H) 97 - 108 mmol/L  
 CO2 23 21 - 32 mmol/L Anion gap 10 5 - 15 mmol/L Glucose 106 (H) 65 - 100 mg/dL BUN 51 (H) 6 - 20 MG/DL Creatinine 1.76 (H) 0.70 - 1.30 MG/DL  
 BUN/Creatinine ratio 29 (H) 12 - 20 GFR est AA 45 (L) >60 ml/min/1.73m2 GFR est non-AA 37 (L) >60 ml/min/1.73m2 Calcium 9.6 8.5 - 10.1 MG/DL Bilirubin, total 0.7 0.2 - 1.0 MG/DL  
 ALT (SGPT) 70 12 - 78 U/L  
 AST (SGOT) 143 (H) 15 - 37 U/L Alk. phosphatase 68 45 - 117 U/L Protein, total 7.5 6.4 - 8.2 g/dL Albumin 2.9 (L) 3.5 - 5.0 g/dL Globulin 4.6 (H) 2.0 - 4.0 g/dL A-G Ratio 0.6 (L) 1.1 - 2.2 LACTIC ACID Collection Time: 10/09/20 11:22 AM  
Result Value Ref Range Lactic acid 1.8 0.4 - 2.0 MMOL/L  
EKG, 12 LEAD, INITIAL Collection Time: 10/09/20 11:29 AM  
Result Value Ref Range Ventricular Rate 140 BPM  
 Atrial Rate 140 BPM  
 P-R Interval 174 ms QRS Duration 74 ms Q-T Interval 328 ms QTC Calculation (Bezet) 500 ms Calculated P Axis 85 degrees Calculated R Axis -20 degrees Calculated T Axis 44 degrees Diagnosis Sinus tachycardia Inferior infarct , age undetermined Anterior infarct , age undetermined When compared with ECG of 18-APR-2016 12:45, 
Vent. rate has increased BY  72 BPM 
QRS duration has decreased Inferior infarct is now present Non-specific change in ST segment in Anterior leads Confirmed by Mingo Diaz M.D., Michelle Melendez ([id number]) on 10/9/2020 11:48:20 AM 
  
URINALYSIS W/MICROSCOPIC Collection Time: 10/09/20 12:48 PM  
Result Value Ref Range Color DARK YELLOW Appearance CLOUDY (A) CLEAR Specific gravity 1.025 1.003 - 1.030    
 pH (UA) 5.0 5.0 - 8.0 Protein 100 (A) NEG mg/dL Glucose Negative NEG mg/dL Ketone Negative NEG mg/dL Blood MODERATE (A) NEG Urobilinogen 1.0 0.2 - 1.0 EU/dL Nitrites Negative NEG Leukocyte Esterase Negative NEG    
 WBC 5-10 0 - 4 /hpf  
 RBC  0 - 5 /hpf Epithelial cells FEW FEW /lpf Bacteria Negative NEG /hpf  
 Granular cast 0-2 (A) NEG /lpf Yeast PRESENT (A) NEG URINE CULTURE HOLD SAMPLE Collection Time: 10/09/20 12:48 PM  
 Specimen: Serum; Urine Result Value Ref Range Urine culture hold Urine on hold in Microbiology dept for 2 days. If unpreserved urine is submitted, it cannot be used for addtional testing after 24 hours, recollection will be required. BILIRUBIN, CONFIRM Collection Time: 10/09/20 12:48 PM  
Result Value Ref Range Bilirubin UA, confirm Negative Recent Labs 10/09/20 
1122 WBC 4.9 HGB 16.6 HCT 51.7*  
 Recent Labs 10/09/20 
1122   
K 3.9 * CO2 23 * BUN 51* CREA 1.76* CA 9.6 ALB 2.9* ALT 70 Impression/Recomendations Acute hypoxic respiratory failure 2/2 Covid PNA 
- pt has dementia and lethargic - unable to participate in meaningful conversations - Per discussions with daughter with ED physician - pt wants to be comfortable - Bipap removed and placed on Non air breather by ED physician 
- recommend hospice consult - c/w drop plus precautions - comfort measures by hospice Discussed with hospice MD Dr. Jos Ledezma and RN Deon Price- coordinating care for patent with family to admit to inpatient hsopice Thank you very much for allowing us to participate in the care of this pleasant patient. The hospitalist service will sign off Please do not hesitate to page with any questions or to discuss the case.  
 
 
Signed by:     Jay Smith MD   
 October 9, 2020 at 4:33 PM

## 2020-10-09 NOTE — PROGRESS NOTES
This patient was evaluated in the ED for respiratory failure related to Covid 19 infection. The patient was unresponsive at the time of my assessment. He came in through EMS from a LTC facility, has dementia and no family present. No reliable information was obtained related to this scale.

## 2020-10-09 NOTE — HOSPICE
[facility] Good Help to Those in Need 
[phone] Inpatient Nursing Admission Patient Name: Neo Donnelly YOB: 1932 Age: 80 y.o. Date of Hospice Admission: 10/9/2020 Hospice Attending Elected by Patient: Rony Peralta MD 
Primary Care Physician: Felicia Carpenter MD 
Admitting RN: Ramya Reagan RN : Luis Manuel Barnes LCSW Level of Care (GIP/Routine/Respite): GIP Facility of Care: [facility] Patient Room: ER17/17 HOSPICE SUMMARY  
ER Visits/ Hospitalizations in past year: 0 Hospice Diagnosis: Acute respiratory failure ([address] [J96.00] Onset Date of Hospice Diagnosis: 10/2020 Summary of Disease Progression Leading to Hospice Diagnosis:  
Neo Donnelly is a 80 y.o. white male with PMH of dementia , CVA, HTN, BPH came to ED from [facility] [address] [place] [address] for hypoxia and fever. Patient is letahrgic and hx dementia, he is poor historian. Per ED notes: pt reportedly tested positive for Covid 19  four days ago. [facility] developed  fever of 102 today, shortness of breath, tachypnea, desaturation to 88%, and tachycardia. So he was sent to [facility] ED. He was placed on bipap on arrival to ED. ED physician Dr. Alberto Nunez discussed with patient's daughter who agreed for comfort measures. Patient was then placed on Non air breather 10l NC. Hospitalist consulted for admission. Patient is seen and examined at bedside in ED. Lethargic, not responsive and moaning in pain. Co-Morbidities:  
Patient Active Problem List  
Diagnosis Code  BPH (benign prostatic hyperplasia) N40.0  At risk for falling Z91.81  
 Dupuytren contracture M72.0  Wears hearing aid Z97.4  AR (allergic rhinitis) J30.9  Shoulder dislocation S43.006A  Shoulder pain M25.519  Chest pain, unspecified R07.9  Syncope and collapse R55  Atrial ectopy I49.1  Abnormal finding on EKG R94.31  Ventricular ectopic beats I49.3  Hypertension I10  
 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 who was admitted to [facility]. The patient's principle diagnosis of acute respiratory failure has resulted in dyspnea, fever, anxiety and debility. Functionally, the patient's Palliative Performance Scale has declined over a period of days and is estimated at 20. Objective information that support this patients limited prognosis includes: Covid 19 infection x 1 week. The patient/family chose comfort measures with the support of Hospice. Patient meets for GIP LOC as evidenced by dyspnea and anxiety Prognosis estimated based on 10/09/20 clinical assessment is:  
[] Few to Many Hours [] Hours to Days  
[] Few to Many Days [x] Days to Weeks  
[] Few to Many Weeks  
[] Weeks to Months  
[] Few to Many Months ASSESSMENT Patient self-reports:  []  Yes    [x] No 
 
SYMPTOMS: dyspnea and anxiety SIGNS/PHYSICAL FINDINGS: patient received in bed on 10L oxygen via non re breather. RR 33-40. Oxygen saturations 98%. Patient febrile. Patient had received 4 mg morphine an hour prior. Oxygen removed and patient's saturations remained steady and respirations dropped to 24-28. KARNOFSKY: 20 
 
FAST for all dementia:   
 
Learning Assessment: 
Patient Unable to learn due to dementia Caregiver Receptive to learning CLINICAL INFORMATION Wt Readings from Last 3 Encounters:  
10/09/20 79.8 kg (176 lb) 04/19/16 80 kg (176 lb 5.9 oz) 04/18/16 81.6 kg (180 lb) Ht Readings from Last 3 Encounters:  
10/09/20 6' 1\" (1.854 m) 04/18/16 6' 1\" (1.854 m) 04/18/16 6' 1\" (1.854 m) Body mass index is 23.22 kg/m². Visit Vitals /60 Pulse (!) 119 Temp 99.3 °F (37.4 °C) Resp 27 Ht 6' 1\" (1.854 m) Wt 79.8 kg (176 lb) SpO2 96% BMI 23.22 kg/m² LAB VALUES 
   
CBC WITH AUTOMATED DIFF Collection Time: 10/09/20 11:22 AM  
Result Value Ref Range WBC 4.9 4.1 - 11.1 K/uL  
 RBC 6.11 (H) 4.10 - 5.70 M/uL  
 HGB 16.6 12.1 - 17.0 g/dL HCT 51.7 (H) 36.6 - 50.3 % MCV 84.6 80.0 - 99.0 FL  
 MCH 27.2 26.0 - 34.0 PG  
 MCHC 32.1 30.0 - 36.5 g/dL  
 RDW 15.8 (H) 11.5 - 14.5 % PLATELET 414 495 - 751 K/uL MPV 10.8 8.9 - 12.9 FL  
 NRBC 0.0 0  WBC ABSOLUTE NRBC 0.00 0.00 - 0.01 K/uL NEUTROPHILS 82 (H) 32 - 75 % LYMPHOCYTES 13 12 - 49 % MONOCYTES 4 (L) 5 - 13 % EOSINOPHILS 0 0 - 7 % BASOPHILS 0 0 - 1 % IMMATURE GRANULOCYTES 1 (H) 0.0 - 0.5 % ABS. NEUTROPHILS 4.0 1.8 - 8.0 K/UL  
 ABS. LYMPHOCYTES 0.6 (L) 0.8 - 3.5 K/UL  
 ABS. MONOCYTES 0.2 0.0 - 1.0 K/UL  
 ABS. EOSINOPHILS 0.0 0.0 - 0.4 K/UL  
 ABS. BASOPHILS 0.0 0.0 - 0.1 K/UL  
 ABS. IMM. GRANS. 0.1 (H) 0.00 - 0.04 K/UL  
 DF SMEAR SCANNED    
 RBC COMMENTS NORMOCYTIC, NORMOCHROMIC No results found for this visit on 10/09/20 (from the past 6 hour(s)). Lab Results Component Value Date/Time Protein, total 7.5 10/09/2020 11:22 AM  
 Albumin 2.9 (L) 10/09/2020 11:22 AM  
 
 
Currently this patient has: 
[] Supplemental O2 [x] Peripheral IV  [] PICC    [] PORT  
[] Reyna Catheter [] NG Tube   [] PEG Tube [] Ostomy   
[] AICD: Has ICD been deactivated? [] Yes [] No:______ PLAN 1. Admit to [facility] the [facility] LOC. 2. Schedule IV Morphine 2 mg and IV Ativan 0.5 mg q 4 hours. 3. IV Toradol available for fever 4. Update family daily by telephone Hospice Team Frequency Orders: 
Skilled Nurse -  Daily x 7 days /every other day x 7 days  with 5 PRN visits for symptom control. MSW  1 visit for initial assessment/evaluation for family support and need for volunteer services. Cristin Lao   1 visit for initial assessment/evaluation for spiritual support. ADVANCE CARE PLANNING (Complete in ACP Flow Sheet) Code Status: DNR Durable DNR: [x]  Yes  []  No 
Code Status Discussed/Confirmed:DNR Preference for Other Life Sustaining Treatment Discussed/Confirmed: 
Hospitalization Preference: [facility] Advance Care Planning 2016 Patient's Healthcare Decision Maker is: Legal Next of Kin Primary Decision Maker Name Howie Adamson Primary Decision Maker Phone Number [phone] Primary Decision Maker Relationship to Patient Adult child Secondary Decision Maker Name Aleida Nance Secondary Decision Maker Phone Number [phone] Secondary Decision Maker Relationship to Patient Adult child Confirm Advance Directive Yes, on file Does the patient have other document types -  Service: [x] Yes  []  No      [] Unknown Appropriate for Pinning Ceremony:  [] Yes     [x] No 
Quaker: Baptist  Home: [place] 1. Discharge Plan: Patient will likely  in the hospital, however should he improve, he will be discharged back to [address] [facility] 2. Patient/Family teaching: EOL symptoms and medications 3. Response to patient/family teaching:  Receptive SOCIAL/EMOTIONAL/SPIRITUAL NEEDS Spiritual Issues Identified: none at this time Psych/ Social/ Emotional Issues Identified: Family signed consents from home by email. They understand that they can visit if they choose to come in. Patient has been living in the [address] [place] [address] at [place] [phone] Caregiver Support: 
[x] Provided information on End of Life Care  
[x] Material Provided: Karen Angulo From My [employer]ight or [facility]  
 
CARE COORDINATION Dr. Dixon Smalls contacted, agrees to serve as attending provider for hospice and provided verbal certification of terminal illness with life expectancy of 6 months or less.  Orders for hospice admission, medications and plan of treatment received. Medication reconciliation completed. MEDS: See medication list below DME: Per hospital 
Supplies: Per hospital 
IDT communication to include MD, SN, SW, CH and support team 
 
ALLERGIES AND MEDICATIONS Allergies: Allergies Allergen Reactions  Bactrim [Sulfamethoprim Ds] Nausea Only  Sulfa (Sulfonamide Antibiotics) Nausea Only  
  dizzy Current Facility-Administered Medications Medication Dose Route Frequency  morphine injection 2 mg  2 mg IntraVENous Q4H  
 LORazepam (ATIVAN) injection 0.5 mg  0.5 mg IntraVENous Q4H  
 ondansetron (ZOFRAN) injection 4 mg  4 mg IntraVENous Q4H PRN  
 LORazepam (ATIVAN) injection 0.5 mg  0.5 mg IntraVENous Q30MIN PRN  
 ketorolac (TORADOL) injection 30 mg  30 mg IntraVENous Q8H PRN  
 glycopyrrolate (ROBINUL) injection 0.2 mg  0.2 mg IntraVENous Q4H PRN  
 bisacodyL (DULCOLAX) suppository 10 mg  10 mg Rectal DAILY PRN  
 morphine injection 2 mg  2 mg IntraVENous Q15MIN PRN Current Outpatient Medications Medication Sig  
 busPIRone (BUSPAR) 5 mg tablet Take 5 mg by mouth two (2) times a day.  clonazePAM (KLONOPIN) 0.5 mg tablet Take 0.5 mg by mouth nightly.  memantine (NAMENDA) 5 mg tablet Take 5 mg by mouth two (2) times a day.  risperiDONE (RISPERDAL) 1 mg tablet Take 1 mg by mouth two (2) times daily as needed.  traZODone (DESYREL) 50 mg tablet Take 50 mg by mouth nightly.  atenolol (TENORMIN) 25 mg tablet Take 25 mg by mouth daily. Hold for SBP <140  acetaminophen (TYLENOL) 325 mg tablet Take 325 mg by mouth every four (4) hours as needed.  aspirin 81 mg chewable tablet Take 81 mg by mouth daily.  multivitamin (ONE A DAY) tablet Take 1 Tab by mouth daily.  tamsulosin (FLOMAX) 0.4 mg capsule Take 0.4 mg by mouth daily.  finasteride (PROSCAR) 5 mg tablet Take 5 mg by mouth daily.

## 2020-10-09 NOTE — PROGRESS NOTES
Problem: Falls - Risk of 
Goal: *Absence of Falls Description: Document Albino Messing Fall Risk and appropriate interventions in the flowsheet. Outcome: Progressing Towards Goal 
Note: Fall Risk Interventions: 
  
 
Problem: End of Life Process Goal: Demonstrate understanding of end of life processes Description: Patient/caregiver will understand end of life processes. Outcome: Progressing Towards Goal 
  
Problem: End of Life Process Goal: Demonstrate understanding of end of life processes Description: Patient/caregiver will understand end of life processes. Outcome: Progressing Towards Goal 
    
Problem: Patient Education: Go to Patient Education Activity Goal: Patient/Family Education Outcome: Progressing Towards Goal 
  
Problem: Dyspnea Due to End of Life Goal: Demonstrate understanding of and ability to manage respiratory symptoms at end of life Outcome: Progressing Towards Goal

## 2020-10-09 NOTE — PROGRESS NOTES
Received consult from ED Placed call to [phone] Hospice team is at bedside, MD and Hospice RN [facility] is calling family to discuss consents/admission Per Hospice the patient does meet inpatient criteria, they discussed hospitalist admitting for comfort care vs direct  Inpatient hospice admission. Care Management to follow. Nav Trammell RN, BSN, [facility] 
ED Care Management [phone]

## 2020-10-10 NOTE — HOSPICE
[address] [facility] Good Help to Those in Need 
[phone] [facility] Daily Nursing Note Patient Name: Yue Stone YOB: 1932 Age: 80 y.o. Date of Visit: 10/10/20 Facility of Care: [facility] Patient Room: 204/01 Hospice Attending: Sena Boeck, MD 
Hospice Diagnosis: Acute respiratory failure ([address] [J96.00] Level of Care: GIP Current GIP Symptoms 1. Patient is unresponsive, with labored breathing 2. Fever with tachycardia, hypotensive, on 5 liters of O2 mouth breathing 3. Breathing is very agonal, accessory muscle use, diaphoretic 4. COVID + ASSESSMENT & PLAN Must update Plan of Care including visit frequencies for IDT members 1. Patient with IV dilaudid 1mg every 4 hours, prn available every 30 minutes as needed for severe pain,dyspnea 2. IV toradol available for fever, weaned to 2 liters of O2 for comfort as patient is mouth breathing 3. IV ativan 0.5mg every 4 hours for anxiety, agitation, available every 30 minutes as needed for severe distress 4. Turn and reposition patient every 4 hours, oral care as tolerated 5. Support family- one visitor allowed, family understands that PPE will be required as patient is covid + Spiritual Interventions: hospital  services available 24 hours a day in hospital setting Psych/ Social/ Emotional Interventions: spoke with son Shine Rubin via telephone to update him on care, noted that patient appears more imminent today and offered to have a one person visit due to covid restrictions. Son states that either he or his sister may come. They were able to visit the patient in the ED last night. Care Coordination Needs: Reviewed plan of care with Dr. Violetta Andres, family and RN Renetta Galicia who is caring for patient today Care plan and New Orders discussed / approved with Dr. Violetta Andres MD. Description History and Chart Review If this is initial GIP note must document RN assessment/MD communication in previous setting. Specifically document nursing/medication needs in last 24 hours to support GIP care Narrative History of last 24 hours that demonstrates care cannot be provided in another setting: 
Patient requiring IV medications, titration of medications, Rn monitoring What has been done to control the patient's symptoms in the last 24 hours? Increased dosing of scheduled medications, use of prn medications, titration of oxgyen Does the patient currently require IV medications? yes Does the patient currently require scheduled medications? yes Does the patient currently require a PCA? no 
 
List number of doses of PRN medications in last 24 hours: 
Medication 1: dilaudid Number of doses:1 Medication 2: ativan Number of doses:1 Medication 3: robinul Number of doses:3 Supporting documentation for GIP need for pain control: 
[x] Frequent evaluation by a doctor, nurse practitioner, nurse  
[x] Frequent medication adjustment   
[x] IVs that cannot be administered at home  
[] Aggressive pain management  
[] Complicated technical delivery of medications Supporting documentation for GIP need for symptom control: 
[x]  Sudden decline necessitating intensive nursing intervention 
[]  Uncontrolled / intractable nausea or vomiting  
[]  Pathological fractures 
[]  Advanced open wounds requiring frequent skilled care [x] Unmanageable respiratory distress 
[] New or worsening delirium  
[] Delirium with behavior issues: Is 24 hour caregiver present due to safety concerns with agitation? (yes/no) [x] Imminent death  with skilled nursing needs documented above DISCHARGE PLANNING Daily discharge planning required for GIP 1. Discharge Plan: Patient appears imminent, will pass at [facility] 2. Patient/Family teaching: Reviewed plan of care, end of life signs and symptoms, offered visit of one family member 3.  Response to patient/family teaching: son was very understanding and will come to hospital today ASSESSMENT   
KARNOFSKY: 10 Prognosis estimated based on 10/10/20 clinical assessment is:  
[x] Few to Many Hours [] Hours to Days  
[] Few to Many Days  
[] Days to Weeks  
[] Few to Many Weeks  
[] Weeks to Months  
[] Few to Many Months Quality Measure: Patient self-reports:  [] Yes    [x] No 
 
ESAS:  
Time of Assessment: 0930 Pain (1-10):8 Fatigue (1-10): 10 Shortness of breath (1-10):10 Nausea (1-10): 5 Appetite (1-10): Anxiety: (1-10): Depression: (1-10): Well-being: (1-10): Constipation: _ Yes  x_ No 
LAST BM: 10/9/2020 CLINICAL INFORMATION Patient Vitals for the past 12 hrs: 
 Temp Pulse Resp BP SpO2  
10/10/20 0938 (!) 102.9 °F (39.4 °C) (!) 131 24 90/62 92 % 10/10/20 0340 (!) 100.7 °F (38.2 °C)    93 % 10/10/20 0110 (!) 101.5 °F (38.6 °C)    90 % 10/09/20 2330 (!) 102.4 °F (39.1 °C) (!) 120 24 95/63  Currently this patient has: 
[x] Supplemental O2 [x] IV   
[] PICC [] PORT  
[] NG Tube   
[] PEG Tube  
[] Ostomy    
[x] Reyna draining hernán urine 
[] Other:  
 
SIGNS/PHYSICAL FINDINGS Skin (including wound): 
[] Warm, dry, supple, intact and color normal for race [x] Warm  
[] Dry  
[] Cool    
[] Clammy [x] Diaphoretic Turgor 
 [] Normal 
 [x] Decreased Color: 
 [] Pink [x] Pale 
 [] Cyanotic 
 [] Erythema 
 [] Jaundice [] Normal for Race 
[]  Wounds: 
 
Neuro: 
[] Lethargy 
[] Restlessness / agitation 
[] Confusion / delirium 
[] Hallucinations 
[] Responds to maximal stimulation 
[x] Unresponsive 
[] Seizures Cardiac: 
[x] Dyspnea on Exertion 
[] JVD [] Murmur 
[] Palpitations [x] Hypotension 
[] Hypertension 
[x] Tachycardia [] Bradycardia [x] Irregular HR 
[] Pulses Decreased 
[] Pulses Absent 
[] Edema:       (Location, Grade and Pitting) [x] Mottling:      Bilateral feet Respiratory: 
Breath sounds:  
 [x] Diminished 
 [] Wheeze [x] Rhonchi 
 [] Rales  
[] Even and unlabored [x] Labored:  24         
[] Cough 
 [] Non Productive 
 [] Productive 
  [] Description:          
[] Deep suctioned  
[x] O2 at 5 LPM 
[] High flow oxygen greater than 10 LPM 
[] Bi-Pap GI [x] Abdomen (describe) soft, obese 
[] Ascites 
[] Nausea 
[] Vomiting 
[x] Incontinent of bowels [x] Bowel sounds (yes/no) [] Diarrhea 
[] Constipation (see above including last bowel movement) [] Checked for impaction 
[x] Last BM 10/9/2020 Nutrition Diet: NPO Appetite:  
[] Good  
[] Fair  
[] Poor  
[] Tube Feeding  
[] Voiding 
[] Incontinent [x] Reyna Musculoskeletal 
[] Balance/[place] Unsteady [x] Weak Strength:  
 [] Normal  
 [] Limited [x] Decreasing Activities:  
 [] Up as tolerated 
 [x] Bedridden  
 [] Specify: 
 
SAFETY [] 24 hr. Caregiver [x] Side rails ? [x] Hospital bed  
[] Reviewed Falls & Safety ALLERGIES AND MEDICATIONS Allergies: Allergies Allergen Reactions  Bactrim [Sulfamethoprim Ds] Nausea Only  Sulfa (Sulfonamide Antibiotics) Nausea Only  
  dizzy Current Facility-Administered Medications Medication Dose Route Frequency  LORazepam (ATIVAN) injection 0.5 mg  0.5 mg IntraVENous Q4H  
 ondansetron (ZOFRAN) injection 4 mg  4 mg IntraVENous Q4H PRN  
 LORazepam (ATIVAN) injection 0.5 mg  0.5 mg IntraVENous Q30MIN PRN  
 ketorolac (TORADOL) injection 30 mg  30 mg IntraVENous Q8H PRN  
 glycopyrrolate (ROBINUL) injection 0.2 mg  0.2 mg IntraVENous Q4H PRN  
 bisacodyL (DULCOLAX) suppository 10 mg  10 mg Rectal DAILY PRN  
 HYDROmorphone (PF) (DILAUDID) injection 1 mg  1 mg IntraVENous Q4H  
 HYDROmorphone (PF) (DILAUDID) injection 1 mg  1 mg IntraVENous Q30MIN PRN Visit Time In: 4935 Visit Time Out: 1000

## 2020-10-10 NOTE — PROGRESS NOTES
Bedside and Verbal shift change report given to Dick Easton RN (oncoming nurse) by OLEKSANDR Morley (offgoing nurse). Report included the following information SBAR, Kardex, ED Summary, Intake/Output, MAR and Recent Results.

## 2020-10-10 NOTE — HOSPICE
Son Eliseo Rouse, and his wife Shyla Daigle, patient's  daughter Robin Romero all spoke with patient via my telephone to be able to say good bye to father. Facetime/zoom calls offered but they preferred audio only. Both very tearful on phone calls, reassured that patient is comfortable. Daughter offered that patient was a big fan of [employer] music. [address] Son Thi Tom was able to come and visit with patient at bedside today. He was very appreciative of the time and of the wonderful care family is receiving. Family has decided upon [place], phone number is [phone]. Thank you, 
 
Laureen Ramirez Rn 
[facility] [phone] Mobile

## 2020-10-10 NOTE — PROGRESS NOTES
10/10/20 3526 Vitals Temp (!) 102.9 °F (39.4 °C) Temp Source Axillary Pulse (Heart Rate) (!) 131 Heart Rate Source Monitor Resp Rate 24  
O2 Sat (%) 92 % Level of Consciousness Alert BP 90/62 MAP (Calculated) 71 BP 1 Location Left arm BP 1 Method Automatic  
BP Patient Position At rest  
MEWS Score 8  
hospice/comfort care

## 2020-10-10 NOTE — ED NOTES
Bedside and Verbal shift change report given to Kanwal Garcia RN (oncoming nurse) by Frannie Sage RN (offgoing nurse). Report included the following information SBAR, Kardex, ED Summary and MAR.

## 2020-10-10 NOTE — ROUTINE PROCESS
Verbal shift change report given to roberto renee rn (oncoming nurse) by Dyllan Cowan (offgoing nurse). Report included the following information SBAR and Kardex.

## 2020-10-10 NOTE — PROGRESS NOTES
TRANSFER - IN REPORT: 
 
Verbal report received from Zeenat(name) on Monica Copeland  being received from RN(unit) for routine progression of care Report consisted of patients Situation, Background, Assessment and  
Recommendations(SBAR). Information from the following report(s) SBAR, Kardex and ED Summary was reviewed with the receiving nurse. Opportunity for questions and clarification was provided. Assessment completed upon patients arrival to unit and care assumed.

## 2020-10-10 NOTE — H&P
[address] [place] [address] Good Help to Those in Need 
[phone] Patient Name: Monica Copeland YOB: 1932 Date of Provider Hospice Visit: 10/09/20 Level of Care:   [x] General Inpatient (GIP)    [] Routine   [] Respite Current Location of Care: 
[] [facility] [] [facility] [] [address] [] [facility] [] [facility], patient referred from: 
[] [facility] [] [facility] [] [address] [] [facility] [] Home [] Other:  
 
Date of [address] [facility] Admission: 10/9/2020 Hospice Medical Director at time of admission: Afshin Howell MD 
 
Principle Hospice Diagnosis: Acute respiratory failure Diagnoses RELATED to the terminal prognosis:  
COVID-19 Other Diagnoses:  
Dementia H/o CVA HTN 
BPH Marella May Copeland is a 80y.o. year old who was admitted to [address] [place] [address]. He was reportedly diagnosed with COVID-19 several days ago at [facility] and was sent to [facility] on 10/9/2020 for acute decline. In the ED he was hypoxic and febrile. He required BiPAP for a time. When I assessed him, he was on NRB and was unresponsive to questions with labored breathing, tachypnea, and restlessness. The family chose to pursue comfort measures with the support of Hospice. HOSPICE ASSESSMENT Active Symptoms and Issues: 
-Generalized pain 
-Labored breathing and tachypnea 
-Anxiety/agitation/restlessness 
-Decreased responsiveness/Unresponsiveness 
-Hospice care PLAN 1. GIP level of care needed for persistent symptoms necessitating frequent skilled nursing assessment and administration of parenteral medications. Needs monitoring for need to titrate sx mgt regimen for optimization of comfort. 2. Pt is at high risk of rapid decline and death due to terminal disease process. 3. For pain and labored breathing, we have scheduled dilaudid 1 mg IV every 4 hours routinely and prn pain, air hunger (due to inadequate response to multiple prns of 2 mg IV morphine and 4 mg IV morphine). 4. For restlessness, we have scheduled ativan 0.5 mg IV every 4 hours routinely and prn anxiety, agitation, restlessness 5. Will have glycopyrrolate 0.2 mg IV available prn to help prevent formation of new secretions. Recommend recovery positioning for drainage of current secretions. Gentle anterior suction of secretions okay (i.e. suction around lips/teeth). Recommend avoiding deep suction to prevent irritation, pain, bleeding. 6. Our team discussed with family that patient appeared more comfortable and less restless without NRB (sats were maintained in the upper 90s as well). Family is amenable to continuing RA. 7. Prn bisacodyl for constipation 8. Prn tylenol and/or toradol for fever. 5.  and SW to support family needs. Our hospice liaison has discussed course of care with family by phone. They requested to sign consents by email. 10. Disposition: anticipate that pt will decline and die in the coming hours-days without stabilizing enough for care in the home setting. 11. Hospice plan of care discussed with hospice idt, bedside nurse, hospitalist 
 
Prognosis estimated based on 10/09/20 clinical assessment is:  
[x] Hours to Days   
[] Days to Weeks   
[] Other: 
 
Communicated plan of care with: Hospice Case Manager; Hospice IDT; Care Team 
 
 GOALS OF CARE Patient/Medical POA stated Goal of Care: optimize patient comfort 
 
[] I have reviewed and/or updated ACP information in the Advance Care Planning Navigator. This information is available in the [address] link in the patient's chart header. Primary Medical Decision Maker:  
 
Resuscitation Status: DNR If DNR is there a Durable DNR on file? : [] Yes [] No (If no, complete Durable DNR) HISTORY History obtained from: chart review, bedside nurse, hospice liaison CHIEF COMPLAINT: tachypnea and restlessness per liaison The patient is:  
[] Verbal 
[] Nonverbal 
[x] Unresponsive HPI/SUBJECTIVE:   
 10/9: patient unresponsive. He was reportedly diagnosed with COVID-19 10 days ago at [address] [place] [address] and was sent to [facility] on 10/9/2020 for acute decline. In the ED he was hypoxic and febrile. He required BiPAP for a time. When I assessed him, he was on NRB and was unresponsive to questions with labored breathing, tachypnea, and restlessness. The family chose to pursue comfort measures with the support of Hospice. REVIEW OF SYSTEMS The following systems were: [] reviewed  [x] unable to be reviewed as pt is unresponsive Positive ROS include bold items: 
Constitutional: fatigue, weakness, in pain, short of breath Ears/nose/mouth/throat: increased airway secretions Respiratory:shortness of breath, wheezing Gastrointestinal:poor appetite, nausea, vomiting, abdominal pain, constipation, diarrhea Musculoskeletal:pain, deformities, swelling legs Neurologic:confusion, hallucinations, weakness Psychiatric:anxiety, feeling depressed, poor sleep Endocrine: increased thirst, increased hunger Adult Non-Verbal Pain Assessment Score: 4 Face 
[] 0   No particular expression or smile 
[x] 1   Occasional grimace, tearing, frowning, wrinkled forehead 
[] 2   Frequent grimace, tearing, frowning, wrinkled forehead Activity (movement) [] 0   Lying quietly, normal position 
[x] 1   Seeking attention through movement or slow, cautious movement 
[] 2   Restless, excessive activity and/or withdrawal reflexes Guarding 
[x] 0   Lying quietly, no positioning of hands over areas of body 
[] 1   Splinting areas of the body, tense 
[] 2   Rigid, stiff Physiology (vital signs) [x] 0   Stable vital signs [] 1   Change in any of the following: SBP > 20mm Hg; HR > 20/minute 
[] 2   Change in any of the following: SBP > 30mm Hg; HR > 25/minute Respiratory 
[] 0   Baseline RR/SpO2, compliant with ventilator 
[] 1   RR > 10 above baseline, or 5% drop SpO2, mild asynchrony with ventilator [x] 2   RR > 20 above baseline, or 10% drop SpO2, asynchrony with ventilator FUNCTIONAL ASSESSMENT Palliative Performance Scale (PPS): 10 PSYCHOSOCIAL/SPIRITUAL ASSESSMENT Active Problems: 
  Acute respiratory failure ([facility] [address] (10/9/2020) Past Medical History:  
Diagnosis Date  Arthritis  BPH (benign prostatic hyperplasia)  Dementia ([facility] [address]  Hearing loss   
 with aids  Hypertension 13  
 cerebrovascular white matter dz also  Stroke ([facility] [address]  Syncope and collapse Past Surgical History:  
Procedure Laterality Date  ENDOSCOPY, COLON, DIAGNOSTIC  10/03  
 due 10/13  ENDOSCOPY, COLON, DIAGNOSTIC  2011  
 does not need to repeat d/t age  HX HEENT    
 cataract removal  
 HX KNEE REPLACEMENT    
 HX ORTHOPAEDIC    
 growth removed right palm  HX PACEMAKER    
 HX TONSIL AND ADENOIDECTOMY  HX VASECTOMY  IMPLANT  LOOP RECORDER  2013  TOTAL KNEE ARTHROPLASTY  02  
 left Social History Tobacco Use  Smoking status: Former Smoker Packs/day: 0.75 Years: 30.00 Pack years: 22.50 Last attempt to quit: 1981 Years since quittin.5  Smokeless tobacco: Never Used Substance Use Topics  Alcohol use: No  
  Alcohol/week: 1.7 standard drinks Types: 2 Glasses of wine per week Family History Problem Relation Age of Onset  Cancer Maternal Grandmother   
     breast cancer  Cancer Paternal Grandmother   
     stomach cancer Allergies Allergen Reactions  Bactrim [Sulfamethoprim Ds] Nausea Only  Sulfa (Sulfonamide Antibiotics) Nausea Only  
  dizzy Current Facility-Administered Medications Medication Dose Route Frequency  morphine injection 2 mg  2 mg IntraVENous Q4H  
 LORazepam (ATIVAN) injection 0.5 mg  0.5 mg IntraVENous Q4H  
 ondansetron (ZOFRAN) injection 4 mg  4 mg IntraVENous Q4H PRN  
  LORazepam (ATIVAN) injection 0.5 mg  0.5 mg IntraVENous Q30MIN PRN  
 ketorolac (TORADOL) injection 30 mg  30 mg IntraVENous Q8H PRN  
 glycopyrrolate (ROBINUL) injection 0.2 mg  0.2 mg IntraVENous Q4H PRN  
 bisacodyL (DULCOLAX) suppository 10 mg  10 mg Rectal DAILY PRN  
 morphine injection 2 mg  2 mg IntraVENous Q15MIN PRN Current Outpatient Medications Medication Sig  
 busPIRone (BUSPAR) 5 mg tablet Take 5 mg by mouth two (2) times a day.  clonazePAM (KLONOPIN) 0.5 mg tablet Take 0.5 mg by mouth nightly.  memantine (NAMENDA) 5 mg tablet Take 5 mg by mouth two (2) times a day.  risperiDONE (RISPERDAL) 1 mg tablet Take 1 mg by mouth two (2) times daily as needed.  traZODone (DESYREL) 50 mg tablet Take 50 mg by mouth nightly.  atenolol (TENORMIN) 25 mg tablet Take 25 mg by mouth daily. Hold for SBP <140  acetaminophen (TYLENOL) 325 mg tablet Take 325 mg by mouth every four (4) hours as needed.  aspirin 81 mg chewable tablet Take 81 mg by mouth daily.  multivitamin (ONE A DAY) tablet Take 1 Tab by mouth daily.  tamsulosin (FLOMAX) 0.4 mg capsule Take 0.4 mg by mouth daily.  finasteride (PROSCAR) 5 mg tablet Take 5 mg by mouth daily. PHYSICAL EXAM  
 
Wt Readings from Last 3 Encounters:  
10/09/20 79.8 kg (176 lb) 04/19/16 80 kg (176 lb 5.9 oz) 04/18/16 81.6 kg (180 lb) Visit Vitals /68 Pulse (!) 120 Temp 99.3 °F (37.4 °C) Resp 24 Ht 6' 1\" (1.854 m) Wt 79.8 kg (176 lb) SpO2 93% BMI 23.22 kg/m² Supplemental O2  [x] Yes  [] NO Last bowel movement:  
 
Currently this patient has: 
[x] Peripheral IV [] PICC  [] PORT [] ICD [] Reyna Catheter [] NG Tube   [] PEG Tube   
[] Rectal Tube [] Drain 
[] Other:  
 
Constitutional: lying abed, elderly, frail, appears uncomfortable Eyes: lids normal, no drainage ENMT: dryness of mm, no exudate, nares normal 
Cardiovascular: tachycardia, peripheral pulses palpable, no LE edema Respiratory: RR uppers 20s-30s on NRB, +increased wob Gastrointestinal: soft, NT 
Musculoskeletal: no gross deformity or TTP Skin: warm, dry, no mottling Neurologic: does not interact when spoken to, +spontaneous movement of extremities Psychiatric: lethargic, +restlessness Pertinent Lab and or Imaging Tests: 
Lab Results Component Value Date/Time Sodium 142 10/09/2020 11:22 AM  
 Potassium 3.9 10/09/2020 11:22 AM  
 Chloride 109 (H) 10/09/2020 11:22 AM  
 CO2 23 10/09/2020 11:22 AM  
 Anion gap 10 10/09/2020 11:22 AM  
 Glucose 106 (H) 10/09/2020 11:22 AM  
 BUN 51 (H) 10/09/2020 11:22 AM  
 Creatinine 1.76 (H) 10/09/2020 11:22 AM  
 BUN/Creatinine ratio 29 (H) 10/09/2020 11:22 AM  
 GFR est AA 45 (L) 10/09/2020 11:22 AM  
 GFR est non-AA 37 (L) 10/09/2020 11:22 AM  
 Calcium 9.6 10/09/2020 11:22 AM  
 
Lab Results Component Value Date/Time Protein, total 7.5 10/09/2020 11:22 AM  
 Albumin 2.9 (L) 10/09/2020 11:22 AM  
 
   
 
Total time:  
Counseling / coordination time:  
> 50% counseling / coordination?:  
 
Hermelindo Gibbons MD 
[facility]

## 2020-10-10 NOTE — ED NOTES
TRANSFER - OUT REPORT: 
 
Verbal report given to OLEKSANDR Rico(name) on Gilmer Espitia  being transferred to (unit) for routine progression of care Report consisted of patients Situation, Background, Assessment and  
Recommendations(SBAR). Information from the following report(s) SBAR and MAR was reviewed with the receiving nurse. Lines:  
Peripheral IV 10/09/20 Left Wrist (Active) Site Assessment Clean, dry, & intact 10/09/20 1120 Phlebitis Assessment 0 10/09/20 1120 Infiltration Assessment 0 10/09/20 1120 Dressing Status Clean, dry, & intact 10/09/20 1120 Dressing Type Transparent;Tape 10/09/20 1120 Hub Color/Line Status Gray;Patent; Flushed 10/09/20 1120 Alcohol Cap Used No 10/09/20 1120 Peripheral IV 10/09/20 Right Antecubital (Active) Opportunity for questions and clarification was provided. Patient transported with: 
 Pinpoint MD

## 2020-10-11 ENCOUNTER — HOME CARE VISIT (OUTPATIENT)
Dept: HOSPICE | Facility: HOSPICE | Age: 85
End: 2020-10-11
Payer: MEDICARE

## 2020-10-11 NOTE — PROGRESS NOTES
Called to examine patient who has . ? No response to verbal and tactile stimuli. ? No respiratory effort. ? Absent heart sounds and pulses. ? Pupils fixed and dilated. Death pronounced at  [phone] on 10/10/20 Patient's family notified Pastoral care aware. Unit personnel will contact:  MD Prema Liu, MSN, RN, NP-C 
[phone] or via [employer]

## 2020-10-11 NOTE — DISCHARGE SUMMARY
Discharge Summary [facility] Good Help to Those in Need 
[phone] Date of Admission: 10/9/2020 Date of Discharge: 10/10/2020 Monica Copeland is a 80y.o. year old who was admitted to [employer] at [facility] with a Hospice diagnosis of Acute respiratory failure ([facility] [address] [J96.00]. Pt was admitted for [facility] level care. Per HPI: 
Eleazar Pa is a 80y.o. year old who was admitted to [facility]. He was reportedly diagnosed with COVID-19 several days ago at [address] [place] [address] and was sent to [facility] on 10/9/2020 for acute decline. In the ED he was hypoxic and febrile. He required BiPAP for a time. When I assessed him, he was on NRB and was unresponsive to questions with labored breathing, tachypnea, and restlessness. The family chose to pursue comfort measures with the support of Hospice. \" The patient's care was focused on comfort, and the patient passed away on 10/10/2020.  
 
Afshin Howell MD

## 2020-10-11 NOTE — PROGRESS NOTES
responded to hospice pt death. No family present however, they were able to visit today. Please contact [phone] [facility] for further support.  follow up as needed. [address] JUDIE MartínezDiv, MACE 
 [phone]

## 2020-10-11 NOTE — HOSPICE
[employer] [facility] Help to Those in Need 
[phone] Discharge/Death Nursing Note Patient Name: Selma Ayon YOB: 1932 Age: 80 y.o. Date of Death: 10/10/2020 Admitted Date: 10/9/2020 Time of Death: 2215 Facility of Care: [address] [place] [address] Level of Care: [facility] Patient Room: 204 Hospice Attending: Dr. Jay Posada Hospice Diagnosis: Acute respiratory failure ([address] [J96.00] Death Pronouncement Pronouncement of death completed by: Darlin Bamberger, NP Agency staff was not present at the time of death · At the time of death the patient was documented as No response to verbal and tactile stimuli. · No respiratory effort. · Absent heart sounds and pulses. · Pupils fixed and dilated.  
  
Death pronounced at  2215 on 10/10/20  
  
 
The pt  within  [address] [place] [address] The following were notified of the patient's death: Family, MD moshe, Jose Land, RN supervisor Medications were disposed of per facility protocol Discharge Summary Discharge Reason: Death Summary of Care Provided: 
 
[x] Post mortem care provided by RN staff [x] Notification of  home by Rn supervisor 
[] Referrals/Community resources provided:  
[] Goals completed 
[] Durable Medical Equipment vendor notified Disciplines involved: [x] RN [x] SW [x]  [] GARCIA [] Vol [] PT [] OT [] ST [] BC 
 
[x] IDT communication/notification Attending Physician, Dr. Norris Russell, notified of death Bereaved Verify bereaved identified with name, address, telephone number and risk level Advance Care Planning 2016 Patient's Healthcare Decision Maker is: Legal Next of Kin Primary Decision Maker Name Frannie Doyle Primary Decision Maker Phone Number [phone] Primary Decision Maker Relationship to Patient Adult child Secondary Decision Maker Name Darlene Fregoso Secondary Decision Maker Phone Number [phone] Secondary Decision Maker Relationship to Patient Adult child Confirm Advance Directive Yes, on file Does the patient have other document types - Alyssia Mccloud  Daughter  [phone] AbbieMackenzie  Spouse  [phone] FH: [employer] [phone]

## 2020-10-14 LAB
BACTERIA SPEC CULT: NORMAL
SERVICE CMNT-IMP: NORMAL
